# Patient Record
Sex: FEMALE | URBAN - METROPOLITAN AREA
[De-identification: names, ages, dates, MRNs, and addresses within clinical notes are randomized per-mention and may not be internally consistent; named-entity substitution may affect disease eponyms.]

---

## 2018-05-02 ENCOUNTER — HOSPITAL LABORATORY (OUTPATIENT)
Dept: OTHER | Facility: CLINIC | Age: 48
End: 2018-05-02

## 2018-05-02 LAB — HGB BLD-MCNC: 13.1 G/DL (ref 11.7–15.7)

## 2018-05-04 ENCOUNTER — TRANSFERRED RECORDS (OUTPATIENT)
Dept: HEALTH INFORMATION MANAGEMENT | Facility: CLINIC | Age: 48
End: 2018-05-04

## 2018-05-16 ENCOUNTER — ANESTHESIA EVENT (OUTPATIENT)
Dept: SURGERY | Facility: CLINIC | Age: 48
End: 2018-05-16
Payer: COMMERCIAL

## 2018-05-16 ENCOUNTER — HOSPITAL ENCOUNTER (INPATIENT)
Facility: CLINIC | Age: 48
LOS: 3 days | Discharge: HOME | End: 2018-05-19
Attending: OBSTETRICS & GYNECOLOGY | Admitting: OBSTETRICS & GYNECOLOGY
Payer: COMMERCIAL

## 2018-05-16 ENCOUNTER — ANESTHESIA (OUTPATIENT)
Dept: SURGERY | Facility: CLINIC | Age: 48
End: 2018-05-16
Payer: COMMERCIAL

## 2018-05-16 DIAGNOSIS — Z90.79 S/P ABDOMINAL HYSTERECTOMY AND LEFT SALPINGO-OOPHORECTOMY: Primary | ICD-10-CM

## 2018-05-16 DIAGNOSIS — L29.9 PRURITIC DISORDER: ICD-10-CM

## 2018-05-16 DIAGNOSIS — Z90.710 S/P ABDOMINAL HYSTERECTOMY AND LEFT SALPINGO-OOPHORECTOMY: Primary | ICD-10-CM

## 2018-05-16 DIAGNOSIS — Z90.721 S/P ABDOMINAL HYSTERECTOMY AND LEFT SALPINGO-OOPHORECTOMY: Primary | ICD-10-CM

## 2018-05-16 LAB
B-HCG SERPL-ACNC: <1 IU/L (ref 0–5)
CREAT SERPL-MCNC: 0.63 MG/DL (ref 0.52–1.04)
GFR SERPL CREATININE-BSD FRML MDRD: >90 ML/MIN/1.7M2

## 2018-05-16 PROCEDURE — 25000125 ZZHC RX 250: Performed by: NURSE ANESTHETIST, CERTIFIED REGISTERED

## 2018-05-16 PROCEDURE — 27210794 ZZH OR GENERAL SUPPLY STERILE: Performed by: OBSTETRICS & GYNECOLOGY

## 2018-05-16 PROCEDURE — 25000125 ZZHC RX 250: Performed by: ANESTHESIOLOGY

## 2018-05-16 PROCEDURE — 25000128 H RX IP 250 OP 636: Performed by: ANESTHESIOLOGY

## 2018-05-16 PROCEDURE — 25000128 H RX IP 250 OP 636: Performed by: NURSE ANESTHETIST, CERTIFIED REGISTERED

## 2018-05-16 PROCEDURE — 36415 COLL VENOUS BLD VENIPUNCTURE: CPT | Performed by: OBSTETRICS & GYNECOLOGY

## 2018-05-16 PROCEDURE — 88307 TISSUE EXAM BY PATHOLOGIST: CPT | Performed by: OBSTETRICS & GYNECOLOGY

## 2018-05-16 PROCEDURE — 82565 ASSAY OF CREATININE: CPT | Performed by: OBSTETRICS & GYNECOLOGY

## 2018-05-16 PROCEDURE — 37000008 ZZH ANESTHESIA TECHNICAL FEE, 1ST 30 MIN: Performed by: OBSTETRICS & GYNECOLOGY

## 2018-05-16 PROCEDURE — 0UT90ZZ RESECTION OF UTERUS, OPEN APPROACH: ICD-10-PCS | Performed by: OBSTETRICS & GYNECOLOGY

## 2018-05-16 PROCEDURE — 27210995 ZZH RX 272: Performed by: OBSTETRICS & GYNECOLOGY

## 2018-05-16 PROCEDURE — 25000128 H RX IP 250 OP 636: Performed by: OBSTETRICS & GYNECOLOGY

## 2018-05-16 PROCEDURE — 0UT60ZZ RESECTION OF LEFT FALLOPIAN TUBE, OPEN APPROACH: ICD-10-PCS | Performed by: OBSTETRICS & GYNECOLOGY

## 2018-05-16 PROCEDURE — 88307 TISSUE EXAM BY PATHOLOGIST: CPT | Mod: 26 | Performed by: OBSTETRICS & GYNECOLOGY

## 2018-05-16 PROCEDURE — 40000170 ZZH STATISTIC PRE-PROCEDURE ASSESSMENT II: Performed by: OBSTETRICS & GYNECOLOGY

## 2018-05-16 PROCEDURE — 71000012 ZZH RECOVERY PHASE 1 LEVEL 1 FIRST HR: Performed by: OBSTETRICS & GYNECOLOGY

## 2018-05-16 PROCEDURE — 84702 CHORIONIC GONADOTROPIN TEST: CPT | Performed by: OBSTETRICS & GYNECOLOGY

## 2018-05-16 PROCEDURE — 36000056 ZZH SURGERY LEVEL 3 1ST 30 MIN: Performed by: OBSTETRICS & GYNECOLOGY

## 2018-05-16 PROCEDURE — 25000566 ZZH SEVOFLURANE, EA 15 MIN: Performed by: OBSTETRICS & GYNECOLOGY

## 2018-05-16 PROCEDURE — 12000007 ZZH R&B INTERMEDIATE

## 2018-05-16 PROCEDURE — 36000058 ZZH SURGERY LEVEL 3 EA 15 ADDTL MIN: Performed by: OBSTETRICS & GYNECOLOGY

## 2018-05-16 PROCEDURE — 37000009 ZZH ANESTHESIA TECHNICAL FEE, EACH ADDTL 15 MIN: Performed by: OBSTETRICS & GYNECOLOGY

## 2018-05-16 RX ORDER — ONDANSETRON 2 MG/ML
4 INJECTION INTRAMUSCULAR; INTRAVENOUS EVERY 30 MIN PRN
Status: DISCONTINUED | OUTPATIENT
Start: 2018-05-16 | End: 2018-05-16 | Stop reason: HOSPADM

## 2018-05-16 RX ORDER — NALOXONE HYDROCHLORIDE 0.4 MG/ML
.1-.4 INJECTION, SOLUTION INTRAMUSCULAR; INTRAVENOUS; SUBCUTANEOUS
Status: DISCONTINUED | OUTPATIENT
Start: 2018-05-16 | End: 2018-05-16

## 2018-05-16 RX ORDER — LIDOCAINE HYDROCHLORIDE 20 MG/ML
INJECTION, SOLUTION INFILTRATION; PERINEURAL PRN
Status: DISCONTINUED | OUTPATIENT
Start: 2018-05-16 | End: 2018-05-16

## 2018-05-16 RX ORDER — SODIUM CHLORIDE, SODIUM LACTATE, POTASSIUM CHLORIDE, CALCIUM CHLORIDE 600; 310; 30; 20 MG/100ML; MG/100ML; MG/100ML; MG/100ML
INJECTION, SOLUTION INTRAVENOUS CONTINUOUS
Status: DISCONTINUED | OUTPATIENT
Start: 2018-05-16 | End: 2018-05-16 | Stop reason: HOSPADM

## 2018-05-16 RX ORDER — DEXTROAMPHETAMINE SACCHARATE, AMPHETAMINE ASPARTATE, DEXTROAMPHETAMINE SULFATE AND AMPHETAMINE SULFATE 2.5; 2.5; 2.5; 2.5 MG/1; MG/1; MG/1; MG/1
30 TABLET ORAL 3 TIMES DAILY PRN
Status: DISCONTINUED | OUTPATIENT
Start: 2018-05-16 | End: 2018-05-19 | Stop reason: HOSPADM

## 2018-05-16 RX ORDER — KETOROLAC TROMETHAMINE 30 MG/ML
30 INJECTION, SOLUTION INTRAMUSCULAR; INTRAVENOUS EVERY 6 HOURS PRN
Status: DISCONTINUED | OUTPATIENT
Start: 2018-05-16 | End: 2018-05-18

## 2018-05-16 RX ORDER — EPHEDRINE SULFATE 50 MG/ML
INJECTION, SOLUTION INTRAMUSCULAR; INTRAVENOUS; SUBCUTANEOUS PRN
Status: DISCONTINUED | OUTPATIENT
Start: 2018-05-16 | End: 2018-05-16

## 2018-05-16 RX ORDER — FLUTICASONE PROPIONATE 50 MCG
2 SPRAY, SUSPENSION (ML) NASAL DAILY
Status: DISCONTINUED | OUTPATIENT
Start: 2018-05-17 | End: 2018-05-19 | Stop reason: HOSPADM

## 2018-05-16 RX ORDER — MEPERIDINE HYDROCHLORIDE 25 MG/ML
12.5 INJECTION INTRAMUSCULAR; INTRAVENOUS; SUBCUTANEOUS EVERY 5 MIN PRN
Status: DISCONTINUED | OUTPATIENT
Start: 2018-05-16 | End: 2018-05-16 | Stop reason: HOSPADM

## 2018-05-16 RX ORDER — LIDOCAINE 40 MG/G
CREAM TOPICAL
Status: DISCONTINUED | OUTPATIENT
Start: 2018-05-16 | End: 2018-05-19 | Stop reason: HOSPADM

## 2018-05-16 RX ORDER — GLYCOPYRROLATE 0.2 MG/ML
INJECTION, SOLUTION INTRAMUSCULAR; INTRAVENOUS PRN
Status: DISCONTINUED | OUTPATIENT
Start: 2018-05-16 | End: 2018-05-16

## 2018-05-16 RX ORDER — CLOBETASOL PROPIONATE 0.5 MG/ML
SOLUTION TOPICAL DAILY PRN
Status: DISCONTINUED | OUTPATIENT
Start: 2018-05-16 | End: 2018-05-16

## 2018-05-16 RX ORDER — PROPOFOL 10 MG/ML
INJECTION, EMULSION INTRAVENOUS PRN
Status: DISCONTINUED | OUTPATIENT
Start: 2018-05-16 | End: 2018-05-16

## 2018-05-16 RX ORDER — TRETINOIN 0.5 MG/G
CREAM TOPICAL DAILY PRN
COMMUNITY

## 2018-05-16 RX ORDER — IBUPROFEN 600 MG/1
600 TABLET, FILM COATED ORAL EVERY 6 HOURS PRN
Status: DISCONTINUED | OUTPATIENT
Start: 2018-05-16 | End: 2018-05-18

## 2018-05-16 RX ORDER — ALBUTEROL SULFATE 90 UG/1
2 AEROSOL, METERED RESPIRATORY (INHALATION) EVERY 4 HOURS PRN
Status: DISCONTINUED | OUTPATIENT
Start: 2018-05-16 | End: 2018-05-19 | Stop reason: HOSPADM

## 2018-05-16 RX ORDER — ONDANSETRON 2 MG/ML
4 INJECTION INTRAMUSCULAR; INTRAVENOUS EVERY 6 HOURS PRN
Status: DISCONTINUED | OUTPATIENT
Start: 2018-05-16 | End: 2018-05-19 | Stop reason: HOSPADM

## 2018-05-16 RX ORDER — CEFAZOLIN SODIUM 1 G/3ML
1 INJECTION, POWDER, FOR SOLUTION INTRAMUSCULAR; INTRAVENOUS SEE ADMIN INSTRUCTIONS
Status: DISCONTINUED | OUTPATIENT
Start: 2018-05-16 | End: 2018-05-16 | Stop reason: HOSPADM

## 2018-05-16 RX ORDER — HYDRALAZINE HYDROCHLORIDE 20 MG/ML
2.5-5 INJECTION INTRAMUSCULAR; INTRAVENOUS EVERY 10 MIN PRN
Status: DISCONTINUED | OUTPATIENT
Start: 2018-05-16 | End: 2018-05-16 | Stop reason: HOSPADM

## 2018-05-16 RX ORDER — SODIUM CHLORIDE, SODIUM LACTATE, POTASSIUM CHLORIDE, CALCIUM CHLORIDE 600; 310; 30; 20 MG/100ML; MG/100ML; MG/100ML; MG/100ML
INJECTION, SOLUTION INTRAVENOUS CONTINUOUS
Status: DISCONTINUED | OUTPATIENT
Start: 2018-05-16 | End: 2018-05-19 | Stop reason: HOSPADM

## 2018-05-16 RX ORDER — ALBUTEROL SULFATE 0.83 MG/ML
2.5 SOLUTION RESPIRATORY (INHALATION) EVERY 4 HOURS PRN
Status: DISCONTINUED | OUTPATIENT
Start: 2018-05-16 | End: 2018-05-16 | Stop reason: HOSPADM

## 2018-05-16 RX ORDER — ACETAMINOPHEN 325 MG/1
975 TABLET ORAL EVERY 8 HOURS
Status: DISCONTINUED | OUTPATIENT
Start: 2018-05-16 | End: 2018-05-19 | Stop reason: HOSPADM

## 2018-05-16 RX ORDER — VENLAFAXINE HYDROCHLORIDE 150 MG/1
300 CAPSULE, EXTENDED RELEASE ORAL
Status: DISCONTINUED | OUTPATIENT
Start: 2018-05-17 | End: 2018-05-19 | Stop reason: HOSPADM

## 2018-05-16 RX ORDER — FENTANYL CITRATE 50 UG/ML
INJECTION, SOLUTION INTRAMUSCULAR; INTRAVENOUS PRN
Status: DISCONTINUED | OUTPATIENT
Start: 2018-05-16 | End: 2018-05-16

## 2018-05-16 RX ORDER — TRIAMCINOLONE ACETONIDE 40 MG/ML
INJECTION, SUSPENSION INTRA-ARTICULAR; INTRAMUSCULAR PRN
Status: DISCONTINUED | OUTPATIENT
Start: 2018-05-16 | End: 2018-05-16 | Stop reason: HOSPADM

## 2018-05-16 RX ORDER — CEFAZOLIN SODIUM 2 G/100ML
2 INJECTION, SOLUTION INTRAVENOUS
Status: COMPLETED | OUTPATIENT
Start: 2018-05-16 | End: 2018-05-16

## 2018-05-16 RX ORDER — VECURONIUM BROMIDE 1 MG/ML
INJECTION, POWDER, LYOPHILIZED, FOR SOLUTION INTRAVENOUS PRN
Status: DISCONTINUED | OUTPATIENT
Start: 2018-05-16 | End: 2018-05-16

## 2018-05-16 RX ORDER — METOCLOPRAMIDE HYDROCHLORIDE 5 MG/ML
10 INJECTION INTRAMUSCULAR; INTRAVENOUS EVERY 6 HOURS PRN
Status: DISCONTINUED | OUTPATIENT
Start: 2018-05-16 | End: 2018-05-19 | Stop reason: HOSPADM

## 2018-05-16 RX ORDER — NALOXONE HYDROCHLORIDE 0.4 MG/ML
.1-.4 INJECTION, SOLUTION INTRAMUSCULAR; INTRAVENOUS; SUBCUTANEOUS
Status: DISCONTINUED | OUTPATIENT
Start: 2018-05-16 | End: 2018-05-19 | Stop reason: HOSPADM

## 2018-05-16 RX ORDER — PROPOFOL 10 MG/ML
INJECTION, EMULSION INTRAVENOUS CONTINUOUS PRN
Status: DISCONTINUED | OUTPATIENT
Start: 2018-05-16 | End: 2018-05-16

## 2018-05-16 RX ORDER — IBUPROFEN 600 MG/1
600 TABLET, FILM COATED ORAL EVERY 6 HOURS PRN
Status: DISCONTINUED | OUTPATIENT
Start: 2018-05-16 | End: 2018-05-16

## 2018-05-16 RX ORDER — DOCUSATE SODIUM 100 MG/1
100 CAPSULE, LIQUID FILLED ORAL 2 TIMES DAILY
Status: DISCONTINUED | OUTPATIENT
Start: 2018-05-16 | End: 2018-05-19 | Stop reason: HOSPADM

## 2018-05-16 RX ORDER — ACETAMINOPHEN 325 MG/1
650 TABLET ORAL EVERY 4 HOURS PRN
Status: DISCONTINUED | OUTPATIENT
Start: 2018-05-16 | End: 2018-05-18

## 2018-05-16 RX ORDER — CLOBETASOL PROPIONATE 0.5 MG/ML
SOLUTION TOPICAL DAILY PRN
Status: DISCONTINUED | OUTPATIENT
Start: 2018-05-16 | End: 2018-05-19 | Stop reason: HOSPADM

## 2018-05-16 RX ORDER — KETOROLAC TROMETHAMINE 30 MG/ML
30 INJECTION, SOLUTION INTRAMUSCULAR; INTRAVENOUS EVERY 6 HOURS PRN
Status: DISCONTINUED | OUTPATIENT
Start: 2018-05-16 | End: 2018-05-16

## 2018-05-16 RX ORDER — ONDANSETRON 4 MG/1
4 TABLET, ORALLY DISINTEGRATING ORAL EVERY 30 MIN PRN
Status: DISCONTINUED | OUTPATIENT
Start: 2018-05-16 | End: 2018-05-16 | Stop reason: HOSPADM

## 2018-05-16 RX ORDER — DEXAMETHASONE SODIUM PHOSPHATE 4 MG/ML
INJECTION, SOLUTION INTRA-ARTICULAR; INTRALESIONAL; INTRAMUSCULAR; INTRAVENOUS; SOFT TISSUE PRN
Status: DISCONTINUED | OUTPATIENT
Start: 2018-05-16 | End: 2018-05-16

## 2018-05-16 RX ORDER — METOCLOPRAMIDE 5 MG/1
10 TABLET ORAL EVERY 6 HOURS PRN
Status: DISCONTINUED | OUTPATIENT
Start: 2018-05-16 | End: 2018-05-19 | Stop reason: HOSPADM

## 2018-05-16 RX ORDER — LABETALOL HYDROCHLORIDE 5 MG/ML
10 INJECTION, SOLUTION INTRAVENOUS
Status: DISCONTINUED | OUTPATIENT
Start: 2018-05-16 | End: 2018-05-16 | Stop reason: HOSPADM

## 2018-05-16 RX ORDER — FENTANYL CITRATE 50 UG/ML
25-50 INJECTION, SOLUTION INTRAMUSCULAR; INTRAVENOUS
Status: DISCONTINUED | OUTPATIENT
Start: 2018-05-16 | End: 2018-05-16 | Stop reason: HOSPADM

## 2018-05-16 RX ORDER — ONDANSETRON 2 MG/ML
INJECTION INTRAMUSCULAR; INTRAVENOUS PRN
Status: DISCONTINUED | OUTPATIENT
Start: 2018-05-16 | End: 2018-05-16

## 2018-05-16 RX ORDER — NEOSTIGMINE METHYLSULFATE 1 MG/ML
VIAL (ML) INJECTION PRN
Status: DISCONTINUED | OUTPATIENT
Start: 2018-05-16 | End: 2018-05-16

## 2018-05-16 RX ORDER — ZAFIRLUKAST 20 MG/1
20 TABLET, FILM COATED ORAL 2 TIMES DAILY
Status: DISCONTINUED | OUTPATIENT
Start: 2018-05-16 | End: 2018-05-19 | Stop reason: HOSPADM

## 2018-05-16 RX ORDER — MAGNESIUM HYDROXIDE 1200 MG/15ML
LIQUID ORAL PRN
Status: DISCONTINUED | OUTPATIENT
Start: 2018-05-16 | End: 2018-05-16 | Stop reason: HOSPADM

## 2018-05-16 RX ORDER — BUSPIRONE HYDROCHLORIDE 15 MG/1
30 TABLET ORAL 2 TIMES DAILY
Status: DISCONTINUED | OUTPATIENT
Start: 2018-05-16 | End: 2018-05-19 | Stop reason: HOSPADM

## 2018-05-16 RX ORDER — LORATADINE 10 MG/1
10 TABLET ORAL EVERY EVENING
Status: DISCONTINUED | OUTPATIENT
Start: 2018-05-16 | End: 2018-05-19 | Stop reason: HOSPADM

## 2018-05-16 RX ORDER — VALACYCLOVIR HYDROCHLORIDE 1 G/1
1000 TABLET, FILM COATED ORAL EVERY MORNING
Status: DISCONTINUED | OUTPATIENT
Start: 2018-05-17 | End: 2018-05-19 | Stop reason: HOSPADM

## 2018-05-16 RX ORDER — PROPRANOLOL HYDROCHLORIDE 120 MG/1
120 CAPSULE, EXTENDED RELEASE ORAL EVERY EVENING
Status: DISCONTINUED | OUTPATIENT
Start: 2018-05-16 | End: 2018-05-19 | Stop reason: HOSPADM

## 2018-05-16 RX ORDER — SCOLOPAMINE TRANSDERMAL SYSTEM 1 MG/1
1 PATCH, EXTENDED RELEASE TRANSDERMAL
Status: DISCONTINUED | OUTPATIENT
Start: 2018-05-16 | End: 2018-05-19 | Stop reason: HOSPADM

## 2018-05-16 RX ORDER — ONDANSETRON 4 MG/1
4 TABLET, ORALLY DISINTEGRATING ORAL EVERY 6 HOURS PRN
Status: DISCONTINUED | OUTPATIENT
Start: 2018-05-16 | End: 2018-05-19 | Stop reason: HOSPADM

## 2018-05-16 RX ORDER — HYDROMORPHONE HYDROCHLORIDE 1 MG/ML
.3-.5 INJECTION, SOLUTION INTRAMUSCULAR; INTRAVENOUS; SUBCUTANEOUS EVERY 5 MIN PRN
Status: DISCONTINUED | OUTPATIENT
Start: 2018-05-16 | End: 2018-05-16 | Stop reason: HOSPADM

## 2018-05-16 RX ORDER — PROCHLORPERAZINE MALEATE 5 MG
10 TABLET ORAL EVERY 6 HOURS PRN
Status: DISCONTINUED | OUTPATIENT
Start: 2018-05-16 | End: 2018-05-19 | Stop reason: HOSPADM

## 2018-05-16 RX ORDER — OXYCODONE HYDROCHLORIDE 5 MG/1
5-10 TABLET ORAL
Status: DISCONTINUED | OUTPATIENT
Start: 2018-05-16 | End: 2018-05-18

## 2018-05-16 RX ORDER — HYDROXYZINE HYDROCHLORIDE 25 MG/1
25 TABLET, FILM COATED ORAL EVERY 6 HOURS PRN
Status: DISCONTINUED | OUTPATIENT
Start: 2018-05-16 | End: 2018-05-19 | Stop reason: HOSPADM

## 2018-05-16 RX ADMIN — ONDANSETRON 4 MG: 2 INJECTION INTRAMUSCULAR; INTRAVENOUS at 14:05

## 2018-05-16 RX ADMIN — DEXAMETHASONE SODIUM PHOSPHATE 4 MG: 4 INJECTION, SOLUTION INTRA-ARTICULAR; INTRALESIONAL; INTRAMUSCULAR; INTRAVENOUS; SOFT TISSUE at 14:02

## 2018-05-16 RX ADMIN — Medication 5 MG: at 14:50

## 2018-05-16 RX ADMIN — SODIUM CHLORIDE, POTASSIUM CHLORIDE, SODIUM LACTATE AND CALCIUM CHLORIDE: 600; 310; 30; 20 INJECTION, SOLUTION INTRAVENOUS at 12:20

## 2018-05-16 RX ADMIN — SODIUM CHLORIDE, POTASSIUM CHLORIDE, SODIUM LACTATE AND CALCIUM CHLORIDE: 600; 310; 30; 20 INJECTION, SOLUTION INTRAVENOUS at 22:12

## 2018-05-16 RX ADMIN — CEFAZOLIN SODIUM 2 G: 2 INJECTION, SOLUTION INTRAVENOUS at 14:05

## 2018-05-16 RX ADMIN — LIDOCAINE HYDROCHLORIDE 80 MG: 20 INJECTION, SOLUTION INFILTRATION; PERINEURAL at 13:53

## 2018-05-16 RX ADMIN — MORPHINE SULFATE: 5 INJECTION, SOLUTION INTRAVENOUS at 16:33

## 2018-05-16 RX ADMIN — PHENYLEPHRINE HYDROCHLORIDE 50 MCG: 10 INJECTION, SOLUTION INTRAMUSCULAR; INTRAVENOUS; SUBCUTANEOUS at 15:37

## 2018-05-16 RX ADMIN — FENTANYL CITRATE 25 MCG: 50 INJECTION, SOLUTION INTRAMUSCULAR; INTRAVENOUS at 15:53

## 2018-05-16 RX ADMIN — VECURONIUM BROMIDE 2 MG: 1 INJECTION, POWDER, LYOPHILIZED, FOR SOLUTION INTRAVENOUS at 14:47

## 2018-05-16 RX ADMIN — Medication 5 MG: at 14:46

## 2018-05-16 RX ADMIN — PHENYLEPHRINE HYDROCHLORIDE 50 MCG: 10 INJECTION, SOLUTION INTRAMUSCULAR; INTRAVENOUS; SUBCUTANEOUS at 15:31

## 2018-05-16 RX ADMIN — PHENYLEPHRINE HYDROCHLORIDE 50 MCG: 10 INJECTION, SOLUTION INTRAMUSCULAR; INTRAVENOUS; SUBCUTANEOUS at 15:10

## 2018-05-16 RX ADMIN — PHENYLEPHRINE HYDROCHLORIDE 100 MCG: 10 INJECTION, SOLUTION INTRAMUSCULAR; INTRAVENOUS; SUBCUTANEOUS at 14:55

## 2018-05-16 RX ADMIN — VECURONIUM BROMIDE 1 MG: 1 INJECTION, POWDER, LYOPHILIZED, FOR SOLUTION INTRAVENOUS at 14:25

## 2018-05-16 RX ADMIN — FENTANYL CITRATE 25 MCG: 50 INJECTION, SOLUTION INTRAMUSCULAR; INTRAVENOUS at 15:48

## 2018-05-16 RX ADMIN — PROPOFOL 200 MG: 10 INJECTION, EMULSION INTRAVENOUS at 13:53

## 2018-05-16 RX ADMIN — VECURONIUM BROMIDE 1 MG: 1 INJECTION, POWDER, LYOPHILIZED, FOR SOLUTION INTRAVENOUS at 15:15

## 2018-05-16 RX ADMIN — FENTANYL CITRATE 50 MCG: 50 INJECTION, SOLUTION INTRAMUSCULAR; INTRAVENOUS at 14:42

## 2018-05-16 RX ADMIN — PROPOFOL 30 MCG/KG/MIN: 10 INJECTION, EMULSION INTRAVENOUS at 13:57

## 2018-05-16 RX ADMIN — ROCURONIUM BROMIDE 50 MG: 10 INJECTION INTRAVENOUS at 13:53

## 2018-05-16 RX ADMIN — PHENYLEPHRINE HYDROCHLORIDE 50 MCG: 10 INJECTION, SOLUTION INTRAMUSCULAR; INTRAVENOUS; SUBCUTANEOUS at 15:18

## 2018-05-16 RX ADMIN — SCOPALAMINE 1 PATCH: 1 PATCH, EXTENDED RELEASE TRANSDERMAL at 13:18

## 2018-05-16 RX ADMIN — SODIUM CHLORIDE, POTASSIUM CHLORIDE, SODIUM LACTATE AND CALCIUM CHLORIDE: 600; 310; 30; 20 INJECTION, SOLUTION INTRAVENOUS at 17:58

## 2018-05-16 RX ADMIN — SODIUM CHLORIDE, POTASSIUM CHLORIDE, SODIUM LACTATE AND CALCIUM CHLORIDE: 600; 310; 30; 20 INJECTION, SOLUTION INTRAVENOUS at 14:15

## 2018-05-16 RX ADMIN — VECURONIUM BROMIDE 2 MG: 1 INJECTION, POWDER, LYOPHILIZED, FOR SOLUTION INTRAVENOUS at 14:11

## 2018-05-16 RX ADMIN — MIDAZOLAM 2 MG: 1 INJECTION INTRAMUSCULAR; INTRAVENOUS at 13:45

## 2018-05-16 RX ADMIN — Medication 7.5 MG: at 14:55

## 2018-05-16 RX ADMIN — FENTANYL CITRATE 100 MCG: 50 INJECTION, SOLUTION INTRAMUSCULAR; INTRAVENOUS at 13:53

## 2018-05-16 RX ADMIN — HYDROMORPHONE HYDROCHLORIDE 0.5 MG: 1 INJECTION, SOLUTION INTRAMUSCULAR; INTRAVENOUS; SUBCUTANEOUS at 14:14

## 2018-05-16 RX ADMIN — KETOROLAC TROMETHAMINE 30 MG: 30 INJECTION, SOLUTION INTRAMUSCULAR at 21:15

## 2018-05-16 RX ADMIN — SODIUM CHLORIDE, POTASSIUM CHLORIDE, SODIUM LACTATE AND CALCIUM CHLORIDE: 600; 310; 30; 20 INJECTION, SOLUTION INTRAVENOUS at 15:22

## 2018-05-16 RX ADMIN — NEOSTIGMINE METHYLSULFATE 3.5 MG: 1 INJECTION, SOLUTION INTRAVENOUS at 15:42

## 2018-05-16 RX ADMIN — GLYCOPYRROLATE 0.7 MG: 0.2 INJECTION, SOLUTION INTRAMUSCULAR; INTRAVENOUS at 15:42

## 2018-05-16 RX ADMIN — HYDROMORPHONE HYDROCHLORIDE 0.5 MG: 1 INJECTION, SOLUTION INTRAMUSCULAR; INTRAVENOUS; SUBCUTANEOUS at 17:00

## 2018-05-16 NOTE — IP AVS SNAPSHOT
MRN:[id number]                      After Visit Summary   5/16/2018    Dax Oliver    MRN: [id number]           Thank you!     Thank you for choosing [facility] for your care. Our goal is always to provide you with excellent care. Hearing back from our patients is one way we can continue to improve our services. Please take a few minutes to complete the written survey that you may receive in the mail after you visit with us. Thank you!        Patient Information     Date Of Birth          1970        Designated Caregiver       Most Recent Value    Caregiver    Will someone help with your care after discharge? no      About your hospital stay     You were admitted on:  May 16, 2018 You last received care in the:  [place] [address] [facility]    You were discharged on:  May 19, 2018        Reason for your hospital stay       Hospitalized for surgical removal of uterus and fallopian tube because of a uterine fibroid                  Who to Call     For medical emergencies, please call 911.  For non-urgent questions about your medical care, please call your primary care provider or clinic, [phone]  For questions related to your surgery, please call your surgery clinic        Attending Provider     Provider Specialty    Schwab, Jennifer Lani, MD OB/Gyn       Primary Care Provider Office Phone # Fax #    Jennifer Lani Schwab, [phone]       When to contact your care team       Call if vaginal bleeding like a period.  Call with fever over 100.4 or chills.  Call if wound is red or unusual drainage is seen.                  After Care Instructions     Activity       Your activity upon discharge: Walking and stairs are ok.  No driving a car for 1 week after discharge.  No lifting over 20# until seen in the office.            Diet       Follow this diet upon discharge: Regular            Wound care and dressings       Instructions to care for your wound at  home:Keep incision clean. Ok to shower.  Remove steri strips in a week if they have not fallen off.                  Follow-up Appointments     Follow-up and recommended labs and tests        Follow up at [facility] in 3-4 weeks                  Further instructions from your care team       Discharge Instructions following Vaginal or Abdominal Hysterectomy  [facility]    Please return to the clinic in:       2 weeks;         4 weeks;          6 weeks   Make this appointment after you get home.  Diet:    You may feel less hungry at first.  Try to eat a well balanced diet with lots of proteins, fruits, vegetables, and whole grains.  Avoid spicy and greasy foods.    Drink plenty of fluids - at least 8 tall glasses a day.  Water is best.  Try to limit caffeine (found in coffee, tea, and cola drinks).  This helps prevent constipation (hard stools that are difficult to pass).    If constipation is a problem, consider one of these medicines: docusate (Colace), docusate with casanthranol (Mariela-Colace), psyllium (Metamucil) or milk of magnesia.  You can buy these at the drug store.  Follow the instructions listed on the label.  Activity:    You will need plenty of rest at first.  Slowly go back to your normal activities.  It will help to take several short walks during the day.  It is ok to climb stairs, but use the handrail in case you get dizzy.    Do not drive while using strong pain medications (narcotics).  After that, do not drive until you can stomp on the brakes without pain or flinching.      Do not use tampons, douche, or have sex (intercourse) until you see your doctor.    Don t lift or push anything over 15 lbs until your doctor says it s safe.  Avoid heavy or strenuous exercise.    You may start gentle exercises after two weeks.    Listen to your body.  If you feel tired or have backaches, you may be doing too much too quickly.  Pain control:    You pain  should improve over the next 2-3 weeks.  If you have increased pain, please call the clinic.  It is normal to see a little sore after mild exercise.    Always take your pain medicine as directed by your doctor.  Drink a full glass of water with each dose.      Caring for your incision:    You may see a small amount of fluid draining from your incision (cut).  This is normal.  You may wear a bandage until the drainage stops.    Keep the area clean and dry.  Do not use lotions, powders, or perfumes on the site.    If present, Steri-Strips (small, white tape) may fall off on their own.  If not, remove them after 7 days.    If present, staples will be removed at your next visit.    If present, Dermabond (medical glue used on the skin) will wear off on its own, do not peel it off.  Bathing    Use care to avoid slips and falls.  Gently pat your incisions dry after bathing.    After abdominal hysterectomy (through the belly): you may shower.  Avoid tub baths and swimming for two weeks, or until your cuts heal.    After vaginal hysterectomy (surgery through the vagina): you may bathe or shower as usual.  If you had surgery to repair the vaginal wall, it may help to soak in a warm bath for 20 minutes twice daily.  This will speed healing and reduce tenderness.    If you have a catheter (tube in your bladder) - shower only.  Normal Symptoms:    You may notice a small amount of bleeding or fluid coming from your vagina.  This could last for several weeks.  Wear pads as needed.    You may see stitches poking out of the vagina.  You may also see stitches pass through the vagina (they will look like tiny threads).  Both of these are normal.  Most stitches will dissolve within three months.    The area around your stitches may feel numb.  This should go away in less than a year.    After surgery, it is common to feel dizzy or lightheaded at times.  You might also have hot flashes, trouble sleeping, sudden mood swings and  "irritability.  If any of these symptoms become a problem, please call your doctor s office.  If you had a vaginal repair, you may feel tugging or pulling in the vagina.  This is a normal part of healing.  Hormones:    If we removed your ovaries, you may need hormone medicine (HT, or hormone therapy).  Discuss this with your doctor.  If we did not remove your ovaries, you should reach menopause at the normal time (in general, between ages 40 and 55).        Notify your surgeon for the following signs and symptoms:    Severe chills and temperature of 100.4 oF or greater, taken under the tongue.    Bright red blood or large clots coming out of the vagina - enough to soak one pad (sanitary napkin) per hour.    Increased pain, warmth, swelling, redness at surgery site.    Foul smelling, green/yellow discharge from incision.    Urine or vaginal fluid that smells bad.    You cannot urinate (use the toilet), it burns when you urinate, or you need to go more often.    Severe nausea (feeling sick to your stomach) or vomiting (throwing up).    Increased pain that you cannot control with medicine.    Reji pain and swelling in one or both legs.    Any questions or concerns.      Pending Results     No orders found from 5/14/2018 to 5/17/2018.            Statement of Approval     Ordered          05/19/18 1008  I have reviewed and agree with all the recommendations and orders detailed in this document.  EFFECTIVE NOW     Approved and electronically signed by:  Jase Jarrett MD             Admission Information     Date & Time Provider Department Dept. Phone    5/16/2018 Schwab, Jennifer Lani, MD [place] [address] [facility] [phone]      Your Vitals Were     Blood Pressure Pulse Temperature Respirations Height Weight    134/76 (BP Location: Left arm) 66 98.6  F (37  C) (Oral) 16 1.651 m (5' 5\") 76 kg (167 lb 8.8 oz)    Last Period Pulse Oximetry BMI (Body Mass Index)             (LMP Unknown) 98% " "27.88 kg/m2         [employer] Information     [employer] lets you send messages to your doctor, view your test results, renew your prescriptions, schedule appointments and more. To sign up, go to [email or web address] . Click on \"Log in\" on the left side of the screen, which will take you to the Welcome page. Then click on \"Sign up Now\" on the right side of the page.     You will be asked to enter the access code listed below, as well as some personal information. Please follow the directions to create your username and password.     Your access code is: [email or web address]  Expires: 2018 10:32 AM     Your access code will  in 90 days. If you need help or a new code, please call your [facility] clinic or [phone].        Care EveryWhere ID     This is your Care EveryWhere ID. This could be used by other organizations to access your [facility] medical records  [id number]        Equal Access to Services     SHONNA WRIGHT AH: Hadgwendolyn weno Somilly, waaxda luqadaha, qaybta kaalmada adeegyabia, juany ortiz . So [facility] [phone].    ATENCIÓN: Si habla español, tiene a larios disposición servicios gratuitos de asistencia lingüística. Llame al [phone].    We comply with applicable federal civil rights laws and [place] laws. We do not discriminate on the basis of race, color, national origin, age, disability, sex, sexual orientation, or gender identity.               Review of your medicines      START taking        Dose / Directions    hydrOXYzine 25 MG tablet   Commonly known as:  ATARAX   Used for:  Pruritic disorder   Notes to Patient:  Take as needed        Dose:  25 mg   Take 1 tablet (25 mg) by mouth every 6 hours as needed for itching   Quantity:  20 tablet   Refills:  0       oxyCODONE IR 5 MG tablet   Commonly known as:  ROXICODONE        Dose:  5-10 mg   Take 1-2 tablets (5-10 mg) by mouth every 3 hours   Quantity:  30 tablet   Refills:  0         CONTINUE these medicines " which have NOT CHANGED        Dose / Directions    ADDERALL PO   Notes to Patient:  Take as needed        Dose:  30 mg   Take 30 mg by mouth 3 times daily as needed   Refills:  0       albuterol 108 (90 Base) MCG/ACT Inhaler   Commonly known as:  PROAIR HFA/PROVENTIL HFA/VENTOLIN HFA   Notes to Patient:  Take as needed          Dose:  2 puff   Inhale 2 puffs into the lungs every 4 hours as needed for shortness of breath / dyspnea or wheezing   Refills:  0       BUSPIRONE HCL PO        Dose:  30 mg   Take 30 mg by mouth 2 times daily   Refills:  0       * clobetasol 0.05 % external solution   Commonly known as:  TEMOVATE   Notes to Patient:  Take as needed          Dose:  15 mL   Apply 15 mLs topically 2 times daily as needed   Refills:  0       * clobetasol 0.05 % cream   Commonly known as:  TEMOVATE   Notes to Patient:  Take as needed          Dose:  15 g   Apply 15 g topically 2 times daily as needed   Refills:  0       DEPAKOTE ER PO   Notes to Patient:  Resume at discharge        Dose:  500-1000 mg   Take 500-1,000 mg by mouth every evening   Refills:  0       EFFEXOR XR PO        Dose:  300 mg   Take 300 mg by mouth every morning (Takes 2 x 150mg tablet = 300mg dose)   Refills:  0       fluticasone 50 MCG/ACT spray   Commonly known as:  FLONASE        Dose:  2 spray   Spray 2 sprays into both nostrils daily   Refills:  0       IBUPROFEN PO   Notes to Patient:  Take as needed          Dose:  200-400 mg   Take 200-400 mg by mouth daily as needed for moderate pain   Refills:  0       LEVOCETIRIZINE DIHYDROCHLORIDE PO   Notes to Patient:  Resume at discharge          Dose:  5 mg   Take 5 mg by mouth every evening   Refills:  0       levonorgestrel 20 MCG/24HR IUD   Commonly known as:  MIRENA   Notes to Patient:  Resume at discharge          Dose:  1 each   1 each by Intrauterine route once   Refills:  0       mometasone-formoterol 200-5 MCG/ACT oral inhaler   Commonly known as:  DULERA        Dose:  2 puff   Inhale  2 puffs into the lungs 2 times daily   Refills:  0       PROBIOTIC PO   Notes to Patient:  Resume at discharge          Dose:  1 tablet   Take 1 tablet by mouth daily Pro-15   Refills:  0       PROPRANOLOL HCL ER PO   Notes to Patient:  Resume at discharge          Dose:  120 mg   Take 120 mg by mouth every evening   Refills:  0       SUMATRIPTAN SUCCINATE PO   Notes to Patient:  Take as needed          Dose:  100 mg   Take 100 mg by mouth daily as needed for migraine   Refills:  0       tretinoin 0.05 % cream   Commonly known as:  RETIN-A   Notes to Patient:  Take as needed          Apply topically daily as needed   Refills:  0       VALTREX PO        Dose:  1 g   Take 1 g by mouth every morning   Refills:  0       ZAFIRLUKAST PO        Dose:  20 mg   Take 20 mg by mouth 2 times daily   Refills:  0       * Notice:  This list has 2 medication(s) that are the same as other medications prescribed for you. Read the directions carefully, and ask your doctor or other care provider to review them with you.         Where to get your medicines      Some of these will need a paper prescription and others can be bought over the counter. Ask your nurse if you have questions.     Bring a paper prescription for each of these medications     hydrOXYzine 25 MG tablet    oxyCODONE IR 5 MG tablet                Protect others around you: Learn how to safely use, store and throw away your medicines at www.disposemymeds.org.        ANTIBIOTIC INSTRUCTION     You've Been Prescribed an Antibiotic - Now What?  Your healthcare team thinks that you or your loved one might have an infection. Some infections can be treated with antibiotics, which are powerful, life-saving drugs. Like all medications, antibiotics have side effects and should only be used when necessary. There are some important things you should know about your antibiotic treatment.      Your healthcare team may run tests before you start taking an antibiotic.    Your team  may take samples (e.g., from your blood, urine or other areas) to run tests to look for bacteria. These test can be important to determine if you need an antibiotic at all and, if you do, which antibiotic will work best.      Within a few days, your healthcare team might change or even stop your antibiotic.    Your team may start you on an antibiotic while they are working to find out what is making you sick.    Your team might change your antibiotic because test results show that a different antibiotic would be better to treat your infection.    In some cases, once your team has more information, they learn that you do not need an antibiotic at all. They may find out that you don't have an infection, or that the antibiotic you're taking won't work against your infection. For example, an infection caused by a virus can't be treated with antibiotics. Staying on an antibiotic when you don't need it is more likely to be harmful than helpful.      You may experience side effects from your antibiotic.    Like all medications, antibiotics have side effects. Some of these can be serious.    Let you healthcare team know if you have any known allergies when you are admitted to the hospital.    One significant side effect of nearly all antibiotics is the risk of severe and sometimes deadly diarrhea caused by Clostridium difficile (C. Difficile). This occurs when a person takes antibiotics because some good germs are destroyed. Antibiotic use allows C. diificile to take over, putting patients at high risk for this serious infection.    As a patient or caregiver, it is important to understand your or your loved one's antibiotic treatment. It is especially important for caregivers to speak up when patients can't speak for themselves. Here are some important questions to ask your healthcare team.    What infection is this antibiotic treating and how do you know I have that infection?    What side effects might occur from this  antibiotic?    How long will I need to take this antibiotic?    Is it safe to take this antibiotic with other medications or supplements (e.g., vitamins) that I am taking?     Are there any special directions I need to know about taking this antibiotic? For example, should I take it with food?    How will I be monitored to know whether my infection is responding to the antibiotic?    What tests may help to make sure the right antibiotic is prescribed for me?      Information provided by:  www.cdc.gov/getsmart  U.S. Department of Health and Human Services  Centers for disease Control and Prevention  National Center for Emerging and Zoonotic Infectious Diseases  Division of Healthcare Quality Promotion        Information about OPIOIDS     PRESCRIPTION OPIOIDS: WHAT YOU NEED TO KNOW   You have a prescription for an opioid (narcotic) pain medicine. Opioids can cause addiction. If you have a history of chemical dependency of any type, you are at a higher risk of becoming addicted to opioids. Only take this medicine after all other options have been tried. Take it for as short a time and as few doses as possible.     Do not:    Drive. If you drive while taking these medicines, you could be arrested for driving under the influence (DUI).    Operate heavy machinery    Do any other dangerous activities while taking these medicines.     Drink any alcohol while taking these medicines.      Take with any other medicines that contain acetaminophen. Read all labels carefully. Look for the word  acetaminophen  or  Tylenol.  Ask your pharmacist if you have questions or are unsure.    Store your pills in a secure place, locked if possible. We will not replace any lost or stolen medicine. If you don t finish your medicine, please throw away (dispose) as directed by your pharmacist. The [place] Pollution Control Agency has more information about safe disposal:  https://www.pca.[facility].mn.us/living-green/managing-unwanted-medications    All opioids tend to cause constipation. Drink plenty of water and eat foods that have a lot of fiber, such as fruits, vegetables, prune juice, apple juice and high-fiber cereal. Take a laxative (Miralax, milk of magnesia, Colace, Senna) if you don t move your bowels at least every other day.              Medication List: This is a list of all your medications and when to take them. Check marks below indicate your daily home schedule. Keep this list as a reference.      Medications           Morning Afternoon Evening Bedtime As Needed    ADDERALL PO   Take 30 mg by mouth 3 times daily as needed   Notes to Patient:  Take as needed                                   albuterol 108 (90 Base) MCG/ACT Inhaler   Commonly known as:  PROAIR HFA/PROVENTIL HFA/VENTOLIN HFA   Inhale 2 puffs into the lungs every 4 hours as needed for shortness of breath / dyspnea or wheezing   Notes to Patient:  Take as needed                                     BUSPIRONE HCL PO   Take 30 mg by mouth 2 times daily   Last time this was given:  30 mg on 5/19/2018  8:41 AM   Next Dose Due:  5/19/18 8:00 PM                                      * clobetasol 0.05 % external solution   Commonly known as:  TEMOVATE   Apply 15 mLs topically 2 times daily as needed   Notes to Patient:  Take as needed                                     * clobetasol 0.05 % cream   Commonly known as:  TEMOVATE   Apply 15 g topically 2 times daily as needed   Notes to Patient:  Take as needed                                     DEPAKOTE ER PO   Take 500-1,000 mg by mouth every evening   Notes to Patient:  Resume at discharge                                   EFFEXOR XR PO   Take 300 mg by mouth every morning (Takes 2 x 150mg tablet = 300mg dose)   Last time this was given:  300 mg on 5/19/2018  8:41 AM   Next Dose Due:  5/20/18                                   fluticasone 50 MCG/ACT spray   Commonly  known as:  FLONASE   Spray 2 sprays into both nostrils daily   Last time this was given:  2 sprays on 5/19/2018  8:42 AM   Next Dose Due:  5/20/18                                   hydrOXYzine 25 MG tablet   Commonly known as:  ATARAX   Take 1 tablet (25 mg) by mouth every 6 hours as needed for itching   Last time this was given:  25 mg on 5/19/2018  8:41 AM   Notes to Patient:  Take as needed                                   IBUPROFEN PO   Take 200-400 mg by mouth daily as needed for moderate pain   Last time this was given:  600 mg on 5/19/2018 10:27 AM   Notes to Patient:  Take as needed                                     LEVOCETIRIZINE DIHYDROCHLORIDE PO   Take 5 mg by mouth every evening   Notes to Patient:  Resume at discharge                                     levonorgestrel 20 MCG/24HR IUD   Commonly known as:  MIRENA   1 each by Intrauterine route once   Notes to Patient:  Resume at discharge                                  mometasone-formoterol 200-5 MCG/ACT oral inhaler   Commonly known as:  DULERA   Inhale 2 puffs into the lungs 2 times daily   Last time this was given:  2 puffs on 5/19/2018  8:42 AM   Next Dose Due:  5/19/18 8:00 PM                                      oxyCODONE IR 5 MG tablet   Commonly known as:  ROXICODONE   Take 1-2 tablets (5-10 mg) by mouth every 3 hours   Last time this was given:  10 mg on 5/19/2018 10:27 AM   Next Dose Due:  5/19/20 1:30 PM                                PROBIOTIC PO   Take 1 tablet by mouth daily Pro-15   Notes to Patient:  Resume at discharge                                     PROPRANOLOL HCL ER PO   Take 120 mg by mouth every evening   Last time this was given:  120 mg on 5/18/2018  8:47 PM   Notes to Patient:  Resume at discharge                                     SUMATRIPTAN SUCCINATE PO   Take 100 mg by mouth daily as needed for migraine   Notes to Patient:  Take as needed                                     tretinoin 0.05 % cream   Commonly known  as:  RETIN-A   Apply topically daily as needed   Notes to Patient:  Take as needed                                     VALTREX PO   Take 1 g by mouth every morning   Last time this was given:  1,000 mg on 5/19/2018  8:41 AM   Next Dose Due:  5/20/18                                   ZAFIRLUKAST PO   Take 20 mg by mouth 2 times daily   Last time this was given:  20 mg on 5/19/2018  8:41 AM   Next Dose Due:  5/19/18 8:00 PM                                      * Notice:  This list has 2 medication(s) that are the same as other medications prescribed for you. Read the directions carefully, and ask your doctor or other care provider to review them with you.

## 2018-05-16 NOTE — ANESTHESIA PREPROCEDURE EVALUATION
Anesthesia Evaluation     .             ROS/MED HX    ENT/Pulmonary:     (+)Intermittent asthma , . .   (-) sleep apnea   Neurologic:       Cardiovascular:         METS/Exercise Tolerance:     Hematologic:         Musculoskeletal:         GI/Hepatic:        (-) GERD   Renal/Genitourinary:         Endo:         Psychiatric:     (+) psychiatric history depression      Infectious Disease:         Malignancy:         Other:                     Physical Exam  Normal systems: cardiovascular, pulmonary and dental    Airway   Mallampati: II  TM distance: >3 FB  Neck ROM: full    Dental     Cardiovascular       Pulmonary    breath sounds clear to auscultation                    Anesthesia Plan      History & Physical Review  History and physical reviewed and following examination; no interval change.    ASA Status:  2 .    NPO Status:  > 8 hours    Plan for General and ETT with Intravenous induction. Maintenance will be Balanced.    PONV prophylaxis:  Ondansetron (or other 5HT-3), Dexamethasone or Solumedrol and Scopolamine patch  Propofol gtt      Postoperative Care  Postoperative pain management:  IV analgesics and Oral pain medications.      Consents  Anesthetic plan, risks, benefits and alternatives discussed with:  Patient..                      Procedure: Procedure(s):  HYSTERECTOMY TOTAL ABDOMINAL  SALPINGECTOMY  Preop diagnosis: UTERINE FIBROIDS, MENORRHAGIA,    Allergies   Allergen Reactions     Seasonal Allergies      Past Medical History:   Diagnosis Date     Acne      Anemia      Dense breast tissue      Depression      Fibroids     multiple; 46mm max     Herpes      Menorrhagia      Migraines      Uncomplicated asthma      Past Surgical History:   Procedure Laterality Date     BIOPSY  01/02/2018    endometrium biopsy     ectopic pregnancy removal  1988     Social History   Substance Use Topics     Smoking status: Never Smoker     Smokeless tobacco: Never Used     Alcohol use 0.0 oz/week     0 Standard drinks  or equivalent per week      Comment: every day     Prior to Admission medications    Medication Sig Start Date End Date Taking? Authorizing Provider   albuterol (PROAIR HFA, PROVENTIL HFA, VENTOLIN HFA) 108 (90 BASE) MCG/ACT inhaler Inhale 2 puffs into the lungs every 4 hours as needed for shortness of breath / dyspnea or wheezing   Yes Reported, Patient   Amphetamine-Dextroamphetamine (ADDERALL PO) Take 30 mg by mouth 3 times daily as needed   Yes Reported, Patient   BUSPIRONE HCL PO Take 30 mg by mouth 2 times daily   Yes Reported, Patient   clobetasol (TEMOVATE) 0.05 % cream Apply 15 g topically 2 times daily as needed    Yes Reported, Patient   clobetasol (TEMOVATE) 0.05 % external solution Apply 15 mLs topically 2 times daily as needed    Yes Reported, Patient   Divalproex Sodium (DEPAKOTE ER PO) Take 500-1,000 mg by mouth every evening    Yes Reported, Patient   fluticasone (FLONASE) 50 MCG/ACT nasal spray Spray 2 sprays into both nostrils daily   Yes Reported, Patient   IBUPROFEN PO Take 200-400 mg by mouth daily as needed for moderate pain   Yes Reported, Patient   LEVOCETIRIZINE DIHYDROCHLORIDE PO Take 5 mg by mouth every evening    Yes Reported, Patient   levonorgestrel (MIRENA) 20 MCG/24HR IUD 1 each by Intrauterine route once   Yes Reported, Patient   mometasone-formoterol (DULERA) 200-5 MCG/ACT oral inhaler Inhale 2 puffs into the lungs 2 times daily   Yes Reported, Patient   Probiotic Product (PROBIOTIC PO) Take 1 tablet by mouth daily Pro-15   Yes Reported, Patient   PROPRANOLOL HCL ER PO Take 120 mg by mouth every evening   Yes Reported, Patient   SUMATRIPTAN SUCCINATE PO Take 100 mg by mouth daily as needed for migraine   Yes Reported, Patient   tretinoin (RETIN-A) 0.05 % cream Apply topically daily as needed   Yes Reported, Patient   ValACYclovir HCl (VALTREX PO) Take 1 g by mouth every morning   Yes Reported, Patient   Venlafaxine HCl (EFFEXOR XR PO) Take 300 mg by mouth every morning (Takes 2 x  150mg tablet = 300mg dose)   Yes Reported, Patient   ZAFIRLUKAST PO Take 20 mg by mouth 2 times daily   Yes Reported, Patient     Current Facility-Administered Medications Ordered in Epic   Medication Dose Route Frequency Last Rate Last Dose     ceFAZolin (ANCEF) 1 g vial to attach to  ml bag for ADULT or 50 ml bag for PEDS  1 g Intravenous See Admin Instructions         ceFAZolin (ANCEF) intermittent infusion 2 g in 100 mL dextrose PRE-MIX  2 g Intravenous Pre-Op/Pre-procedure x 1 dose         lactated ringers infusion   Intravenous Continuous 25 mL/hr at 05/16/18 1220       lidocaine 1 % 1 mL  1 mL Other Q1H PRN         No current [facility]-ordered outpatient prescriptions on file.       lactated ringers 25 mL/hr at 05/16/18 1220     Wt Readings from Last 1 Encounters:   05/16/18 72.8 kg (160 lb 7.9 oz)     Temp Readings from Last 1 Encounters:   05/16/18 36.9  C (98.4  F)     BP Readings from Last 6 Encounters:   05/16/18 (!) 149/92   11/08/15 120/74     Pulse Readings from Last 4 Encounters:   05/16/18 73   11/08/15 83     Resp Readings from Last 1 Encounters:   05/16/18 14     SpO2 Readings from Last 1 Encounters:   05/16/18 100%     No results for input(s): NA, POTASSIUM, CHLORIDE, CO2, ANIONGAP, GLC, BUN, CR, THOM in the last 36178 hours.  No results for input(s): AST, ALT, ALKPHOS, BILITOTAL, LIPASE in the last 20824 hours.  Recent Labs   Lab Test  05/02/18   1640   HGB  13.1     No results for input(s): ABO, RH in the last 28228 hours.  No results for input(s): INR, PTT in the last 35699 hours.   No results for input(s): TROPI in the last 94106 hours.  No results for input(s): PH, PCO2, PO2, HCO3 in the last 15968 hours.  No results for input(s): HCG in the last 65011 hours.  No results found for this or any previous visit (from the past 744 hour(s)).    RECENT LABS:   ECG:   ECHO:

## 2018-05-16 NOTE — PROGRESS NOTES
Admission medication history interview status for the 5/16/2018  admission is complete. See EPIC admission navigator for prior to admission medications     Medication history source reliability:Good    Medication history interview source(s):Patient    Medication history resources (including written lists, pill bottles, clinic record):None    Primary pharmacy.[facility]    Additional medication history information not noted on PTA med list :None    Time spent in this activity: 45 minutes    Prior to Admission medications    Medication Sig Last Dose Taking? Auth Provider   albuterol (PROAIR HFA, PROVENTIL HFA, VENTOLIN HFA) 108 (90 BASE) MCG/ACT inhaler Inhale 2 puffs into the lungs every 4 hours as needed for shortness of breath / dyspnea or wheezing more than a week at prn Yes Reported, Patient   Amphetamine-Dextroamphetamine (ADDERALL PO) Take 30 mg by mouth 3 times daily as needed 5/16/2018 at 0700 Yes Reported, Patient   BUSPIRONE HCL PO Take 30 mg by mouth 2 times daily 5/16/2018 at 0700 Yes Reported, Patient   clobetasol (TEMOVATE) 0.05 % cream Apply 15 g topically 2 times daily as needed  more than a week at prn Yes Reported, Patient   clobetasol (TEMOVATE) 0.05 % external solution Apply 15 mLs topically 2 times daily as needed  5/16/2018 at am Yes Reported, Patient   Divalproex Sodium (DEPAKOTE ER PO) Take 500-1,000 mg by mouth every evening  more than a week Yes Reported, Patient   fluticasone (FLONASE) 50 MCG/ACT nasal spray Spray 2 sprays into both nostrils daily 5/16/2018 at 0700 Yes Reported, Patient   IBUPROFEN PO Take 200-400 mg by mouth daily as needed for moderate pain 5/12/2018 at prn Yes Reported, Patient   LEVOCETIRIZINE DIHYDROCHLORIDE PO Take 5 mg by mouth every evening  5/15/2018 at pm Yes Reported, Patient   levonorgestrel (MIRENA) 20 MCG/24HR IUD 1 each by Intrauterine route once in place Yes Reported, Patient   mometasone-formoterol (DULERA) 200-5 MCG/ACT oral inhaler Inhale 2 puffs into the  lungs 2 times daily 5/14/2018 Yes Reported, Patient   Probiotic Product (PROBIOTIC PO) Take 1 tablet by mouth daily Pro-15 5/16/2018 at 0700 Yes Reported, Patient   PROPRANOLOL HCL ER PO Take 120 mg by mouth every evening 5/15/2018 at pm Yes Reported, Patient   SUMATRIPTAN SUCCINATE PO Take 100 mg by mouth daily as needed for migraine more than a week at prn Yes Reported, Patient   tretinoin (RETIN-A) 0.05 % cream Apply topically daily as needed 5/15/2018 at pm Yes Reported, Patient   ValACYclovir HCl (VALTREX PO) Take 1 g by mouth every morning 5/16/2018 at 0700 Yes Reported, Patient   Venlafaxine HCl (EFFEXOR XR PO) Take 300 mg by mouth every morning (Takes 2 x 150mg tablet = 300mg dose) 5/16/2018 at 0700 Yes Reported, Patient   ZAFIRLUKAST PO Take 20 mg by mouth 2 times daily 5/16/2018 at 0700 Yes Reported, Patient

## 2018-05-16 NOTE — IP AVS SNAPSHOT
[facility]    [address] [place] [address]    Phone:  [phone]                                       After Visit Summary   5/16/2018    Dax Oliver    MRN: [id number]           After Visit Summary Signature Page     I have received my discharge instructions, and my questions have been answered. I have discussed any challenges I see with this plan with the nurse or doctor.    ..........................................................................................................................................  Patient/Patient Representative Signature      ..........................................................................................................................................  Patient Representative Print Name and Relationship to Patient    ..................................................               ................................................  Date                                            Time    ..........................................................................................................................................  Reviewed by Signature/Title    ...................................................              ..............................................  Date                                                            Time

## 2018-05-16 NOTE — ANESTHESIA POSTPROCEDURE EVALUATION
Patient: Dax Oliver    Procedure(s):  TOTAL ABDOMINAL HYSTERECTOMY, LEFT SALPINGECTOMY - Wound Class: II-Clean Contaminated   - Wound Class: II-Clean Contaminated    Diagnosis:UTERINE FIBROIDS, MENORRHAGIA,  Diagnosis Additional Information: No value filed.    Anesthesia Type:  General    Note:  Anesthesia Post Evaluation    Patient location during evaluation: PACU  Patient participation: Able to fully participate in evaluation  Level of consciousness: awake, awake and alert and responsive to verbal stimuli  Pain management: adequate  Airway patency: patent  Cardiovascular status: acceptable  Respiratory status: acceptable  Hydration status: acceptable  PONV: none     Anesthetic complications: None          Last vitals:  Vitals:    05/16/18 1700 05/16/18 1715 05/16/18 1728   BP: 135/87 125/79    Pulse:      Resp: 11 9 8   Temp:      SpO2: 100% 100% 100%         Electronically Signed By: Cheryl Peralta  May 16, 2018  5:29 PM

## 2018-05-16 NOTE — OP NOTE
[facility] Gynecology Brief Operative Note    Pre-operative diagnosis: UTERINE FIBROIDS, MENORRHAGIA,   Post-operative diagnosis: Same   Procedure: Total Abdominal hysterectomy, left salpingectomy   Surgeon: Jennifer L. Schwab, MD   Assistant(s): Lv Navarro MD   Anesthesia: General Endotracheal Anesthesia   Estimated blood loss: 100ml   Total IV fluids: (See anesthesia record)   Blood transfusion: No transfusion was given during surgery   Total urine output: (See anesthesia record)   Drains: None   Specimens: Fibroid uterus with cervix, left fallopian tube   Findings: 12 cm multimyomatous uterus, normal appearing left fallopian tube and ovary, surgically absent right fallopian tube and ovary   Complications: None   Condition: Stable   Comments: See dictated operative report for full details

## 2018-05-16 NOTE — ANESTHESIA CARE TRANSFER NOTE
Patient: Dax Oliver    Procedure(s):  TOTAL ABDOMINAL HYSTERECTOMY, LEFT SALPINGECTOMY - Wound Class: II-Clean Contaminated   - Wound Class: II-Clean Contaminated    Diagnosis: UTERINE FIBROIDS, MENORRHAGIA,  Diagnosis Additional Information: No value filed.    Anesthesia Type:   General     Note:  Airway :Face Mask  Patient transferred to:PACU  Handoff Report: Identifed the Patient, Identified the Reponsible Provider, Reviewed the pertinent medical history, Discussed the surgical course, Reviewed Intra-OP anesthesia mangement and issues during anesthesia, Set expectations for post-procedure period and Allowed opportunity for questions and acknowledgement of understanding      Vitals: (Last set prior to Anesthesia Care Transfer)    CRNA VITALS  5/16/2018 1548 - 5/16/2018 1622      5/16/2018             Pulse: 76    SpO2: 100 %    Resp Rate (set): 10                Electronically Signed By: CHUY Mckinney CRNA  May 16, 2018  4:22 PM

## 2018-05-17 LAB — GLUCOSE BLDC GLUCOMTR-MCNC: 116 MG/DL (ref 70–99)

## 2018-05-17 PROCEDURE — 00000146 ZZHCL STATISTIC GLUCOSE BY METER IP

## 2018-05-17 PROCEDURE — 12000007 ZZH R&B INTERMEDIATE

## 2018-05-17 PROCEDURE — 25000132 ZZH RX MED GY IP 250 OP 250 PS 637: Performed by: OBSTETRICS & GYNECOLOGY

## 2018-05-17 PROCEDURE — 25000128 H RX IP 250 OP 636: Performed by: OBSTETRICS & GYNECOLOGY

## 2018-05-17 RX ADMIN — OXYCODONE HYDROCHLORIDE 5 MG: 5 TABLET ORAL at 12:28

## 2018-05-17 RX ADMIN — SODIUM CHLORIDE, POTASSIUM CHLORIDE, SODIUM LACTATE AND CALCIUM CHLORIDE: 600; 310; 30; 20 INJECTION, SOLUTION INTRAVENOUS at 20:52

## 2018-05-17 RX ADMIN — VALACYCLOVIR HYDROCHLORIDE 1000 MG: 1 TABLET, FILM COATED ORAL at 09:31

## 2018-05-17 RX ADMIN — Medication 2 SPRAY: at 10:58

## 2018-05-17 RX ADMIN — ZAFIRLUKAST 20 MG: 20 TABLET, COATED ORAL at 09:30

## 2018-05-17 RX ADMIN — ACETAMINOPHEN 975 MG: 325 TABLET ORAL at 13:18

## 2018-05-17 RX ADMIN — OXYCODONE HYDROCHLORIDE 10 MG: 5 TABLET ORAL at 15:23

## 2018-05-17 RX ADMIN — ZAFIRLUKAST 20 MG: 20 TABLET, COATED ORAL at 20:49

## 2018-05-17 RX ADMIN — PROPRANOLOL HYDROCHLORIDE 120 MG: 120 CAPSULE, EXTENDED RELEASE ORAL at 20:49

## 2018-05-17 RX ADMIN — BUSPIRONE HYDROCHLORIDE 30 MG: 15 TABLET ORAL at 09:29

## 2018-05-17 RX ADMIN — SODIUM CHLORIDE, POTASSIUM CHLORIDE, SODIUM LACTATE AND CALCIUM CHLORIDE: 600; 310; 30; 20 INJECTION, SOLUTION INTRAVENOUS at 06:36

## 2018-05-17 RX ADMIN — SODIUM CHLORIDE, POTASSIUM CHLORIDE, SODIUM LACTATE AND CALCIUM CHLORIDE: 600; 310; 30; 20 INJECTION, SOLUTION INTRAVENOUS at 13:14

## 2018-05-17 RX ADMIN — OXYCODONE HYDROCHLORIDE 10 MG: 5 TABLET ORAL at 23:00

## 2018-05-17 RX ADMIN — VENLAFAXINE HYDROCHLORIDE 300 MG: 150 CAPSULE, EXTENDED RELEASE ORAL at 09:31

## 2018-05-17 RX ADMIN — OXYCODONE HYDROCHLORIDE 10 MG: 5 TABLET ORAL at 19:44

## 2018-05-17 RX ADMIN — BUSPIRONE HYDROCHLORIDE 30 MG: 15 TABLET ORAL at 20:49

## 2018-05-17 RX ADMIN — DOCUSATE SODIUM 100 MG: 100 CAPSULE, LIQUID FILLED ORAL at 20:49

## 2018-05-17 RX ADMIN — DOCUSATE SODIUM 100 MG: 100 CAPSULE, LIQUID FILLED ORAL at 09:31

## 2018-05-17 RX ADMIN — LORATADINE 10 MG: 10 TABLET ORAL at 20:49

## 2018-05-17 RX ADMIN — ACETAMINOPHEN 975 MG: 325 TABLET ORAL at 23:01

## 2018-05-17 NOTE — PLAN OF CARE
Problem: Patient Care Overview  Goal: Plan of Care/Patient Progress Review  Outcome: No Change  Pt A&Ox4; VSS; on RA. IS encouraged. BS hypoactive; not passing flatus yet; pt not eating much but tolerated clear liquids. No c/o n/v. Advanced to full liquid for supper. Abdominal dressing CDI; binder on. Pain managed with PO analgesics (PCA dc'd ); Voiding adequately. No vaginal flow;  Ambulated in pineda with assist of 1 person; will continue to monitor.

## 2018-05-17 NOTE — PROGRESS NOTES
"S: Did well overnight. Pain controlled with Morphine PCA and Toradol. Tolerating sips of water.    O:  /80 (BP Location: Right arm)  Pulse 70  Temp 97.7  F (36.5  C)  Resp 16  Ht 1.651 m (5' 5\")  Wt 72.8 kg (160 lb 7.9 oz)  LMP  (LMP Unknown)  SpO2 100%  BMI 26.71 kg/m2  Gen NAD  Abd soft with appropriate tenderness  Incision covered with clean dressing, changed overnight  Ext no c/c/e +SCDs    A/P: POD # 1 after RAVIN and left salpingectomy for fibroid uterus, clinically stable  Routine postoperative care;  Discontinue ibrahim at 0800, voiding trial  Clear liquid diet this morning, advance as tolerated  Discontinue PCA this morning and transition to oral pain medications  Home medications ordered    Jennifer L. Schwab MD  "

## 2018-05-17 NOTE — PLAN OF CARE
Problem: Hysterectomy (Adult)  Goal: Signs and Symptoms of Listed Potential Problems Will be Absent, Minimized or Managed (Hysterectomy)  Signs and symptoms of listed potential problems will be absent, minimized or managed by discharge/transition of care (reference Hysterectomy (Adult) CPG).  Outcome: No Change  Pt arrived on jacqueline at 1730. A/Ox4. VSS on 1lpm NC. Capno on. LS clear. BS hypo, no flatus. Morales patent with adequate output. Abd dressing reinforced, new dressing CDI. Managing pain with PCA, prn toradol, and heat packs. On clear liquids, hasn't taken anything in orally yet.

## 2018-05-17 NOTE — PLAN OF CARE
Problem: Hysterectomy (Adult)  Goal: Signs and Symptoms of Listed Potential Problems Will be Absent, Minimized or Managed (Hysterectomy)  Signs and symptoms of listed potential problems will be absent, minimized or managed by discharge/transition of care (reference Hysterectomy (Adult) CPG).   VSS, A/O, pt has been lethargic most of shift.  Awakens easily but falls asleep again right away.  Using PCA appropriately.  Urine output adequate per ibrahim--now d/c'd per order.  Bowel sounds hypoactive.  Dressing CDI.  No vag flow.

## 2018-05-18 LAB
COPATH REPORT: NORMAL
GLUCOSE BLDC GLUCOMTR-MCNC: 98 MG/DL (ref 70–99)

## 2018-05-18 PROCEDURE — 00000146 ZZHCL STATISTIC GLUCOSE BY METER IP

## 2018-05-18 PROCEDURE — 25000128 H RX IP 250 OP 636: Performed by: OBSTETRICS & GYNECOLOGY

## 2018-05-18 PROCEDURE — 12000007 ZZH R&B INTERMEDIATE

## 2018-05-18 PROCEDURE — 25000132 ZZH RX MED GY IP 250 OP 250 PS 637: Performed by: OBSTETRICS & GYNECOLOGY

## 2018-05-18 RX ORDER — OXYCODONE HYDROCHLORIDE 5 MG/1
5-10 TABLET ORAL
Status: DISCONTINUED | OUTPATIENT
Start: 2018-05-18 | End: 2018-05-19 | Stop reason: HOSPADM

## 2018-05-18 RX ORDER — IBUPROFEN 600 MG/1
600 TABLET, FILM COATED ORAL EVERY 6 HOURS
Status: DISCONTINUED | OUTPATIENT
Start: 2018-05-18 | End: 2018-05-19 | Stop reason: HOSPADM

## 2018-05-18 RX ORDER — KETOROLAC TROMETHAMINE 30 MG/ML
30 INJECTION, SOLUTION INTRAMUSCULAR; INTRAVENOUS EVERY 6 HOURS
Status: DISCONTINUED | OUTPATIENT
Start: 2018-05-18 | End: 2018-05-19 | Stop reason: HOSPADM

## 2018-05-18 RX ADMIN — IBUPROFEN 600 MG: 600 TABLET ORAL at 15:21

## 2018-05-18 RX ADMIN — DOCUSATE SODIUM 100 MG: 100 CAPSULE, LIQUID FILLED ORAL at 20:47

## 2018-05-18 RX ADMIN — OXYCODONE HYDROCHLORIDE 10 MG: 5 TABLET ORAL at 04:58

## 2018-05-18 RX ADMIN — OXYCODONE HYDROCHLORIDE 10 MG: 5 TABLET ORAL at 12:22

## 2018-05-18 RX ADMIN — HYDROXYZINE HYDROCHLORIDE 25 MG: 25 TABLET ORAL at 23:43

## 2018-05-18 RX ADMIN — SODIUM CHLORIDE, POTASSIUM CHLORIDE, SODIUM LACTATE AND CALCIUM CHLORIDE: 600; 310; 30; 20 INJECTION, SOLUTION INTRAVENOUS at 04:53

## 2018-05-18 RX ADMIN — OXYCODONE HYDROCHLORIDE 10 MG: 5 TABLET ORAL at 21:58

## 2018-05-18 RX ADMIN — OXYCODONE HYDROCHLORIDE 10 MG: 5 TABLET ORAL at 15:21

## 2018-05-18 RX ADMIN — SODIUM CHLORIDE, POTASSIUM CHLORIDE, SODIUM LACTATE AND CALCIUM CHLORIDE: 600; 310; 30; 20 INJECTION, SOLUTION INTRAVENOUS at 13:37

## 2018-05-18 RX ADMIN — PROPRANOLOL HYDROCHLORIDE 120 MG: 120 CAPSULE, EXTENDED RELEASE ORAL at 20:47

## 2018-05-18 RX ADMIN — ACETAMINOPHEN 975 MG: 325 TABLET ORAL at 06:24

## 2018-05-18 RX ADMIN — IBUPROFEN 600 MG: 600 TABLET ORAL at 20:47

## 2018-05-18 RX ADMIN — VALACYCLOVIR HYDROCHLORIDE 1000 MG: 1 TABLET, FILM COATED ORAL at 08:12

## 2018-05-18 RX ADMIN — ZAFIRLUKAST 20 MG: 20 TABLET, COATED ORAL at 20:47

## 2018-05-18 RX ADMIN — BUSPIRONE HYDROCHLORIDE 30 MG: 15 TABLET ORAL at 08:13

## 2018-05-18 RX ADMIN — IBUPROFEN 600 MG: 600 TABLET ORAL at 09:16

## 2018-05-18 RX ADMIN — ZAFIRLUKAST 20 MG: 20 TABLET, COATED ORAL at 08:12

## 2018-05-18 RX ADMIN — ACETAMINOPHEN 975 MG: 325 TABLET ORAL at 13:28

## 2018-05-18 RX ADMIN — DOCUSATE SODIUM 100 MG: 100 CAPSULE, LIQUID FILLED ORAL at 08:13

## 2018-05-18 RX ADMIN — BUSPIRONE HYDROCHLORIDE 30 MG: 15 TABLET ORAL at 20:47

## 2018-05-18 RX ADMIN — OXYCODONE HYDROCHLORIDE 10 MG: 5 TABLET ORAL at 02:02

## 2018-05-18 RX ADMIN — ACETAMINOPHEN 975 MG: 325 TABLET ORAL at 21:58

## 2018-05-18 RX ADMIN — OXYCODONE HYDROCHLORIDE 10 MG: 5 TABLET ORAL at 18:42

## 2018-05-18 RX ADMIN — Medication 2 SPRAY: at 08:19

## 2018-05-18 RX ADMIN — VENLAFAXINE HYDROCHLORIDE 300 MG: 150 CAPSULE, EXTENDED RELEASE ORAL at 08:13

## 2018-05-18 RX ADMIN — LORATADINE 10 MG: 10 TABLET ORAL at 20:47

## 2018-05-18 RX ADMIN — OXYCODONE HYDROCHLORIDE 10 MG: 5 TABLET ORAL at 08:13

## 2018-05-18 ASSESSMENT — PAIN DESCRIPTION - DESCRIPTORS
DESCRIPTORS: ACHING
DESCRIPTORS: ACHING;NAGGING

## 2018-05-18 NOTE — OP NOTE
Procedure Date: 05/16/2018      DATE OF OPERATION:  05/16/2018      PREOPERATIVE DIAGNOSIS:  Uterine fibroids, metrorrhagia, menorrhagia.      POSTOPERATIVE DIAGNOSIS:  Uterine fibroids, metrorrhagia, menorrhagia.      PROCEDURE:  Total abdominal hysterectomy and left salpingectomy.      SURGEON:  Jennifer Schwab, MD      ASSISTANTS:  Lv Navarro MD      ANESTHESIA:  General endotracheal.      ESTIMATED BLOOD LOSS:  100 mL.      SPECIMENS:  Fibroid uterus with cervix, left fallopian tube.      FINDINGS:  A 12 cm multi-myomatous uterus, normal-appearing left fallopian tube and ovary.  Surgically absent right fallopian tube and ovary.      COMPLICATIONS:  None.        DESCRIPTION OF PROCEDURE:  The patient was counseled and consented and taken to the operating room where general endotracheal anesthesia was administered.  She was placed in the dorsal supine position and prepped and draped in the usual sterile fashion.  A Morales catheter was inserted and clear urine was elicited.  After a timeout was performed,  a vertical incision was made over the prior vertical scar.  There was significant keloid and the scar was removed completely.  Scar tissue was then discarded.  The incision was carried down to the underlying layer of fascia with the Bovie electrocautery.  The fascia was nicked to the left of midline and extended superiorly and inferiorly.  Rectus muscles were  in the midline.  Peritoneum was entered bluntly.  Troy O retractor was inserted and then the bowel was packed with 3 lap sponges.  A 12 cm, multi-myomatous uterus was seen with normal-appearing left fallopian tube and ovary.  First, attention was turned to the right side.  The round ligament was suture ligated and the bladder flap was dissected on the right side.  Uterine artery was clamped with a Edin clamp and suture ligated.  Attention was then turned to the left side.  First, the left fallopian tube was resected with use of the Bovie  electrocautery and handed off for pathology.  The round ligament was suture ligated and the bladder flap was created on the left side to meet the right in the midline.  The bladder was then mobilized inferiorly with the use of sharp dissection as well as a sponge stick.  A window was made in a clear portion of the left broad ligament in order to ligate the left utero-ovarian ligament.  This was doubly clamped with Edin clamps and suture ligated.  Uterine artery was then clamped with Edin clamp on the left side.  Additional resection was done on both the left and right side with the Edin clamp and suture ligation in order to reach the level of the cervix.  The cervix was then clamped with a curved Edin clamp and the uterus and cervix were amputated with use of the Vergara scissors.  Specimen was handed off for pathology.  First, the uterus was bivalved in order to confirm that there was indeed a Mirena IUD which was discarded.  The vaginal cuff was then closed with 0 Vicryl in multiple figure-of-eight sutures.  Irrigation was performed and hemostasis was observed.  Minimal bleeding from the left ovary was made hemostatic with the use of 3-0 Vicryl in a figure-of-eight stitch and bovie electocautery.  After hemostasis was observed, laps were removed, and sponge, lap and needle counts were confirmed to be correct.  Troy O retractor was then removed.  Peritoneum was closed with 2-0 Vicryl.  The fascia was closed with a looped PDS.  Subcutaneous tissue was closed in multiple layers with 2-0 plain and skin was closed with 4-0 Vicryl on a Marc needle.  Five mL of Kenalog were then injected subcutaneously and Steri-Strips and a pressure dressing were placed.  The patient was extubated.  She tolerated the procedure well and was transferred to recovery in stable condition.         JENNIFER SCHWAB, MD             D: 05/18/2018   T: 05/18/2018   MT: NTS      Name:     STEVIE TURNER   MRN:      [id number]         Account:        [id number]   :      1970           Procedure Date: 2018      Document: [id number]

## 2018-05-18 NOTE — PLAN OF CARE
Problem: Patient Care Overview  Goal: Plan of Care/Patient Progress Review  Outcome: No Change  A/O, VSS on RA.  Pain managed with prn oxycodone.  Abd incision dressing CDI, abd binder in place. No vaginal discharge.  LS clear.  BS hypoactive, no flatus.  Adequate urine output.  Ambulated in halls x1 in evening.  Asst x1.

## 2018-05-18 NOTE — PLAN OF CARE
Problem: Patient Care Overview  Goal: Plan of Care/Patient Progress Review  Pt a/o x 4. VSS. Pain moderately controlled with scheduled tylenol, ibuprofen, oxy. Dressing removed per surgery, adhesive strips in place. Abdominal pain never seemed to decrease below 5 out of 10. Much encouragement to walk, IVF infusing. Tolerating regular diet, up with A1.

## 2018-05-18 NOTE — PROGRESS NOTES
"GYN/POD #2  Patient is feeling ok this am, pain controlled. She is ambulating. Tolerating full liquids. She has just started passing gas this morning.  /87 (BP Location: Left arm)  Pulse 68  Temp 98.3  F (36.8  C) (Oral)  Resp 16  Ht 1.651 m (5' 5\")  Wt 77.1 kg (170 lb)  LMP  (LMP Unknown)  SpO2 100%  BMI 28.29 kg/m2     Abdomen: mildly distended, dressing removed. Steristrips in place, wound edges well approximated.    A/P: POD #2 s/p Ex-lap/RAVIN/left salpingectony secondary to enlarged fibroid uterus  Advance diet as tolerated.  Ambulation encouraged.  PO pain meds to be taken scheduled.   Anticipate d/c home tomorrow.    Shanel Michel MD, MD on 5/18/2018 at 8:29 AM  "

## 2018-05-19 VITALS
RESPIRATION RATE: 16 BRPM | BODY MASS INDEX: 27.92 KG/M2 | TEMPERATURE: 98.6 F | SYSTOLIC BLOOD PRESSURE: 152 MMHG | WEIGHT: 167.55 LBS | HEART RATE: 66 BPM | DIASTOLIC BLOOD PRESSURE: 96 MMHG | HEIGHT: 65 IN | OXYGEN SATURATION: 100 %

## 2018-05-19 PROCEDURE — 25000132 ZZH RX MED GY IP 250 OP 250 PS 637: Performed by: OBSTETRICS & GYNECOLOGY

## 2018-05-19 RX ORDER — OXYCODONE HYDROCHLORIDE 5 MG/1
5-10 TABLET ORAL
Qty: 30 TABLET | Refills: 0 | Status: SHIPPED | OUTPATIENT
Start: 2018-05-19

## 2018-05-19 RX ORDER — HYDROXYZINE HYDROCHLORIDE 25 MG/1
25 TABLET, FILM COATED ORAL EVERY 6 HOURS PRN
Qty: 20 TABLET | Refills: 0 | Status: SHIPPED | OUTPATIENT
Start: 2018-05-19

## 2018-05-19 RX ADMIN — VALACYCLOVIR HYDROCHLORIDE 1000 MG: 1 TABLET, FILM COATED ORAL at 08:41

## 2018-05-19 RX ADMIN — IBUPROFEN 600 MG: 600 TABLET ORAL at 10:27

## 2018-05-19 RX ADMIN — BUSPIRONE HYDROCHLORIDE 30 MG: 15 TABLET ORAL at 08:41

## 2018-05-19 RX ADMIN — OXYCODONE HYDROCHLORIDE 10 MG: 5 TABLET ORAL at 01:34

## 2018-05-19 RX ADMIN — Medication 2 SPRAY: at 08:42

## 2018-05-19 RX ADMIN — VENLAFAXINE HYDROCHLORIDE 300 MG: 150 CAPSULE, EXTENDED RELEASE ORAL at 08:41

## 2018-05-19 RX ADMIN — ACETAMINOPHEN 975 MG: 325 TABLET ORAL at 06:11

## 2018-05-19 RX ADMIN — OXYCODONE HYDROCHLORIDE 10 MG: 5 TABLET ORAL at 04:10

## 2018-05-19 RX ADMIN — IBUPROFEN 600 MG: 600 TABLET ORAL at 04:10

## 2018-05-19 RX ADMIN — ZAFIRLUKAST 20 MG: 20 TABLET, COATED ORAL at 08:41

## 2018-05-19 RX ADMIN — OXYCODONE HYDROCHLORIDE 10 MG: 5 TABLET ORAL at 07:38

## 2018-05-19 RX ADMIN — HYDROXYZINE HYDROCHLORIDE 25 MG: 25 TABLET ORAL at 08:41

## 2018-05-19 RX ADMIN — OXYCODONE HYDROCHLORIDE 10 MG: 5 TABLET ORAL at 10:27

## 2018-05-19 RX ADMIN — DOCUSATE SODIUM 100 MG: 100 CAPSULE, LIQUID FILLED ORAL at 08:41

## 2018-05-19 ASSESSMENT — PAIN DESCRIPTION - DESCRIPTORS
DESCRIPTORS: ACHING
DESCRIPTORS: ACHING

## 2018-05-19 NOTE — DISCHARGE INSTRUCTIONS
Discharge Instructions following Vaginal or Abdominal Hysterectomy  [facility]    Please return to the clinic in:       2 weeks;         4 weeks;          6 weeks   Make this appointment after you get home.  Diet:    You may feel less hungry at first.  Try to eat a well balanced diet with lots of proteins, fruits, vegetables, and whole grains.  Avoid spicy and greasy foods.    Drink plenty of fluids - at least 8 tall glasses a day.  Water is best.  Try to limit caffeine (found in coffee, tea, and cola drinks).  This helps prevent constipation (hard stools that are difficult to pass).    If constipation is a problem, consider one of these medicines: docusate (Colace), docusate with casanthranol (Mariela-Colace), psyllium (Metamucil) or milk of magnesia.  You can buy these at the drug store.  Follow the instructions listed on the label.  Activity:    You will need plenty of rest at first.  Slowly go back to your normal activities.  It will help to take several short walks during the day.  It is ok to climb stairs, but use the handrail in case you get dizzy.    Do not drive while using strong pain medications (narcotics).  After that, do not drive until you can stomp on the brakes without pain or flinching.      Do not use tampons, douche, or have sex (intercourse) until you see your doctor.    Don t lift or push anything over 15 lbs until your doctor says it s safe.  Avoid heavy or strenuous exercise.    You may start gentle exercises after two weeks.    Listen to your body.  If you feel tired or have backaches, you may be doing too much too quickly.  Pain control:    You pain should improve over the next 2-3 weeks.  If you have increased pain, please call the clinic.  It is normal to see a little sore after mild exercise.    Always take your pain medicine as directed by your doctor.  Drink a full glass of water with each dose.      Caring for your incision:    You may see a small  amount of fluid draining from your incision (cut).  This is normal.  You may wear a bandage until the drainage stops.    Keep the area clean and dry.  Do not use lotions, powders, or perfumes on the site.    If present, Steri-Strips (small, white tape) may fall off on their own.  If not, remove them after 7 days.    If present, staples will be removed at your next visit.    If present, Dermabond (medical glue used on the skin) will wear off on its own, do not peel it off.  Bathing    Use care to avoid slips and falls.  Gently pat your incisions dry after bathing.    After abdominal hysterectomy (through the belly): you may shower.  Avoid tub baths and swimming for two weeks, or until your cuts heal.    After vaginal hysterectomy (surgery through the vagina): you may bathe or shower as usual.  If you had surgery to repair the vaginal wall, it may help to soak in a warm bath for 20 minutes twice daily.  This will speed healing and reduce tenderness.    If you have a catheter (tube in your bladder) - shower only.  Normal Symptoms:    You may notice a small amount of bleeding or fluid coming from your vagina.  This could last for several weeks.  Wear pads as needed.    You may see stitches poking out of the vagina.  You may also see stitches pass through the vagina (they will look like tiny threads).  Both of these are normal.  Most stitches will dissolve within three months.    The area around your stitches may feel numb.  This should go away in less than a year.    After surgery, it is common to feel dizzy or lightheaded at times.  You might also have hot flashes, trouble sleeping, sudden mood swings and irritability.  If any of these symptoms become a problem, please call your doctor s office.  If you had a vaginal repair, you may feel tugging or pulling in the vagina.  This is a normal part of healing.  Hormones:    If we removed your ovaries, you may need hormone medicine (HT, or hormone therapy).  Discuss this  with your doctor.  If we did not remove your ovaries, you should reach menopause at the normal time (in general, between ages 40 and 55).        Notify your surgeon for the following signs and symptoms:    Severe chills and temperature of 100.4 oF or greater, taken under the tongue.    Bright red blood or large clots coming out of the vagina - enough to soak one pad (sanitary napkin) per hour.    Increased pain, warmth, swelling, redness at surgery site.    Foul smelling, green/yellow discharge from incision.    Urine or vaginal fluid that smells bad.    You cannot urinate (use the toilet), it burns when you urinate, or you need to go more often.    Severe nausea (feeling sick to your stomach) or vomiting (throwing up).    Increased pain that you cannot control with medicine.    Reji pain and swelling in one or both legs.    Any questions or concerns.

## 2018-05-19 NOTE — PLAN OF CARE
Problem: Patient Care Overview  Goal: Plan of Care/Patient Progress Review  A&O. HTN noted, within parameters. Pain reduced with scheduled meds. Rating pain around 5, though states as improved overall. Up SBA voiding adequately. Scant vaginal bleeding. Incision CDI. Passing flatus. Plan to discharge today.

## 2018-05-19 NOTE — PROGRESS NOTES
POD #3    No unusual complaints.  Passing gas, no bowel movement yet.  No nausea.  Using vistaril for itching yet.  Using oxycodone. I discussed taking motrin alternating with tylenol and to wean down form oxycodone.    134/36     98.6    Abdomen soft.  Incision healing well    Pathology: benign uterus, tube and leimyoma      Ready for discharge    Home  Vistaril 25 mg # 20  Oxycodone 5 mg # 30  See in 3-4 weeks

## 2018-05-19 NOTE — DISCHARGE SUMMARY
Admit Date:     2018   Discharge Date:           HISTORY OF PRESENT ILLNESS:  The patient is a 47-year-old  3, para 2-0-1-2 female who had a history of myomatous uterus and was having difficulties with menometrorrhagia.  She was admitted for definitive treatment.      She underwent an exploratory laparotomy, total abdominal hysterectomy, and left salpingectomy on 2018 without difficulties.  The patient had had a previous right tube and ovary removed.      Her postoperative recovery has been normal.  She remained afebrile.  Her incision was healing well at the time of discharge.  She has had some itching postoperatively, which has been managed well with Vistaril.  She is continuing at this point still to be taking oxycodone along with her Tylenol and Motrin.  This was discussed with her, try to wean off the oxycodone as soon as possible.  She otherwise is voiding without difficulties, is passing gas but has not had a bowel movement yet.      At the time of discharge, the patient's incision is healing nicely, her Steri-Strips in place.  I discussed wound care with the patient.        PLAN:  The patient is being discharged today on the third postoperative day in satisfactory condition.  She was given routine postop instructions and will be seen in the office in 3 to 4 weeks.  She was given a prescription for Vistaril 25 mg, 20 tablets, and oxycodone 5 mg, 30 tablets.      It should be noted that pathology showed all the surgical specimens were benign.         MIRYAM COLE MD             D: 2018   T: 2018   MT: JEAN      Name:     STEVIE TURNER   MRN:      [id number]        Account:        [id number]   :      1970           Admit Date:     2018                                  Discharge Date:       Document: [id number]

## 2018-05-19 NOTE — PLAN OF CARE
Problem: Patient Care Overview  Goal: Plan of Care/Patient Progress Review  Pt a/o x 4. VSS. Pain moderately controlled with scheduled tylenol, ibuprofen, oxy. Incision CDI, steri strips in place. Showered this AM. Discharge instructions given, all questions answered. Discharge medications given, all belongings sent with pt. Pt set to d/c home with . W/c ride to car via transport NA.

## (undated) DEVICE — DRSG STERI STRIP 1/2X4" R1547

## (undated) DEVICE — ESU GROUND PAD UNIVERSAL W/O CORD

## (undated) DEVICE — GLOVE PROTEXIS MICRO 7.0  2D73PM70

## (undated) DEVICE — DRSG TELFA ISLAND 4X14" 7544

## (undated) DEVICE — SU VICRYL 2-0 CT-1 27" J339H

## (undated) DEVICE — SU VICRYL 2-0 SH 27" J317H

## (undated) DEVICE — WIPES FOLEY CARE SURESTEP PROVON DFC100

## (undated) DEVICE — SU PLAIN 3-0 CT 27" 852H

## (undated) DEVICE — Device

## (undated) DEVICE — SU PLAIN 2-0 CT 27" 853H

## (undated) DEVICE — SU VICRYL 0 CT-2 27" J334H

## (undated) DEVICE — GLOVE PROTEXIS W/NEU-THERA 6.5  2D73TE65

## (undated) DEVICE — SPONGE LAP 18X18" X8435

## (undated) DEVICE — MANIFOLD NEPTUNE 4 PORT 700-20

## (undated) DEVICE — SU VICRYL 4-0 PS-2 18" UND J496H

## (undated) DEVICE — SU VICRYL 2-0 CT-1 27" UND J259H

## (undated) DEVICE — SOL WATER IRRIG 1000ML BOTTLE 2F7114

## (undated) DEVICE — CATH TRAY FOLEY SURESTEP 16FR W/URINE MTR STATLK LF A303416A

## (undated) DEVICE — DRAPE SHEET REV FOLD 3/4 9349

## (undated) DEVICE — SU PDS II 0 CTX 60" Z990G

## (undated) DEVICE — GLOVE PROTEXIS W/NEU-THERA 7.5  2D73TE75

## (undated) DEVICE — SOL NACL 0.9% IRRIG 1000ML BOTTLE 07138-09

## (undated) DEVICE — ESU CLEANER TIP 31142717

## (undated) DEVICE — SU VICRYL 0 CT 36" J358H

## (undated) DEVICE — SU VICRYL 3-0 CT-2 27" J332H

## (undated) DEVICE — SU VICRYL 4-0 KS 27" UND J662H

## (undated) DEVICE — KIT ABDOMINAL HYST SMA15AHFSM

## (undated) DEVICE — LINEN TOWEL PACK X5 5464

## (undated) DEVICE — SU VICRYL 0 CT-1 CR 8X18" J740D

## (undated) DEVICE — GLOVE PROTEXIS MICRO 6.5  2D73PM65

## (undated) RX ORDER — FENTANYL CITRATE 50 UG/ML
INJECTION, SOLUTION INTRAMUSCULAR; INTRAVENOUS
Status: DISPENSED
Start: 2018-05-16

## (undated) RX ORDER — ONDANSETRON 2 MG/ML
INJECTION INTRAMUSCULAR; INTRAVENOUS
Status: DISPENSED
Start: 2018-05-16

## (undated) RX ORDER — PROPOFOL 10 MG/ML
INJECTION, EMULSION INTRAVENOUS
Status: DISPENSED
Start: 2018-05-16

## (undated) RX ORDER — VECURONIUM BROMIDE 1 MG/ML
INJECTION, POWDER, LYOPHILIZED, FOR SOLUTION INTRAVENOUS
Status: DISPENSED
Start: 2018-05-16

## (undated) RX ORDER — SCOLOPAMINE TRANSDERMAL SYSTEM 1 MG/1
PATCH, EXTENDED RELEASE TRANSDERMAL
Status: DISPENSED
Start: 2018-05-16

## (undated) RX ORDER — CEFAZOLIN SODIUM 2 G/100ML
INJECTION, SOLUTION INTRAVENOUS
Status: DISPENSED
Start: 2018-05-16

## (undated) RX ORDER — LIDOCAINE HYDROCHLORIDE 20 MG/ML
INJECTION, SOLUTION EPIDURAL; INFILTRATION; INTRACAUDAL; PERINEURAL
Status: DISPENSED
Start: 2018-05-16

## (undated) RX ORDER — NEOSTIGMINE METHYLSULFATE 1 MG/ML
VIAL (ML) INJECTION
Status: DISPENSED
Start: 2018-05-16

## (undated) RX ORDER — DEXAMETHASONE SODIUM PHOSPHATE 4 MG/ML
INJECTION, SOLUTION INTRA-ARTICULAR; INTRALESIONAL; INTRAMUSCULAR; INTRAVENOUS; SOFT TISSUE
Status: DISPENSED
Start: 2018-05-16

## (undated) RX ORDER — GLYCOPYRROLATE 0.2 MG/ML
INJECTION, SOLUTION INTRAMUSCULAR; INTRAVENOUS
Status: DISPENSED
Start: 2018-05-16

## (undated) RX ORDER — HYDROMORPHONE HYDROCHLORIDE 1 MG/ML
INJECTION, SOLUTION INTRAMUSCULAR; INTRAVENOUS; SUBCUTANEOUS
Status: DISPENSED
Start: 2018-05-16

## (undated) RX ORDER — TRIAMCINOLONE ACETONIDE 40 MG/ML
INJECTION, SUSPENSION INTRA-ARTICULAR; INTRAMUSCULAR
Status: DISPENSED
Start: 2018-05-16